# Patient Record
Sex: MALE | Race: WHITE | NOT HISPANIC OR LATINO | Employment: FULL TIME | URBAN - METROPOLITAN AREA
[De-identification: names, ages, dates, MRNs, and addresses within clinical notes are randomized per-mention and may not be internally consistent; named-entity substitution may affect disease eponyms.]

---

## 2023-03-31 ENCOUNTER — TELEPHONE (OUTPATIENT)
Dept: PRIMARY CARE | Facility: CLINIC | Age: 63
End: 2023-03-31
Payer: COMMERCIAL

## 2023-03-31 DIAGNOSIS — E78.5 HYPERLIPIDEMIA, UNSPECIFIED HYPERLIPIDEMIA TYPE: ICD-10-CM

## 2023-03-31 NOTE — TELEPHONE ENCOUNTER
Refill    Atorvastatin Calcium 10 mg oral tablet,take 1 tablet at bedtime    Pharm: [facility] - [phone]    LR: 9/13/22 90 days 1 refill  LV: 9/22/22  NV: 4/10/23

## 2023-04-03 RX ORDER — ATORVASTATIN CALCIUM 10 MG/1
10 TABLET, FILM COATED ORAL NIGHTLY
Qty: 90 TABLET | Refills: 0 | Status: SHIPPED | OUTPATIENT
Start: 2023-04-03 | End: 2023-04-24 | Stop reason: SDUPTHER

## 2023-04-03 RX ORDER — ATORVASTATIN CALCIUM 10 MG/1
10 TABLET, FILM COATED ORAL NIGHTLY
COMMUNITY
End: 2023-04-03 | Stop reason: SDUPTHER

## 2023-04-10 ENCOUNTER — APPOINTMENT (OUTPATIENT)
Dept: PRIMARY CARE | Facility: CLINIC | Age: 63
End: 2023-04-10
Payer: COMMERCIAL

## 2023-04-24 ENCOUNTER — OFFICE VISIT (OUTPATIENT)
Dept: PRIMARY CARE | Facility: CLINIC | Age: 63
End: 2023-04-24
Payer: COMMERCIAL

## 2023-04-24 VITALS
BODY MASS INDEX: 28.8 KG/M2 | WEIGHT: 195 LBS | SYSTOLIC BLOOD PRESSURE: 126 MMHG | DIASTOLIC BLOOD PRESSURE: 70 MMHG | TEMPERATURE: 97.1 F

## 2023-04-24 DIAGNOSIS — I10 HYPERTENSION, UNSPECIFIED TYPE: Primary | ICD-10-CM

## 2023-04-24 DIAGNOSIS — E78.5 HYPERLIPIDEMIA, UNSPECIFIED HYPERLIPIDEMIA TYPE: ICD-10-CM

## 2023-04-24 DIAGNOSIS — N40.1 BENIGN PROSTATIC HYPERPLASIA WITH LOWER URINARY TRACT SYMPTOMS, SYMPTOM DETAILS UNSPECIFIED: ICD-10-CM

## 2023-04-24 DIAGNOSIS — N52.9 ERECTILE DYSFUNCTION, UNSPECIFIED ERECTILE DYSFUNCTION TYPE: ICD-10-CM

## 2023-04-24 PROBLEM — D17.1 LIPOMA OF BACK: Status: ACTIVE | Noted: 2023-04-24

## 2023-04-24 PROBLEM — R94.31 ABNORMAL EKG: Status: ACTIVE | Noted: 2023-04-24

## 2023-04-24 PROBLEM — N40.0 BPH (BENIGN PROSTATIC HYPERPLASIA): Status: ACTIVE | Noted: 2023-04-24

## 2023-04-24 PROBLEM — D22.5 ATYPICAL NEVUS OF BACK: Status: ACTIVE | Noted: 2023-04-24

## 2023-04-24 PROBLEM — R35.0 URINARY FREQUENCY: Status: ACTIVE | Noted: 2023-04-24

## 2023-04-24 PROBLEM — R35.0 URINARY FREQUENCY: Status: RESOLVED | Noted: 2023-04-24 | Resolved: 2023-04-24

## 2023-04-24 PROCEDURE — 1036F TOBACCO NON-USER: CPT | Performed by: FAMILY MEDICINE

## 2023-04-24 PROCEDURE — 80061 LIPID PANEL: CPT

## 2023-04-24 PROCEDURE — 80053 COMPREHEN METABOLIC PANEL: CPT

## 2023-04-24 PROCEDURE — 3078F DIAST BP <80 MM HG: CPT | Performed by: FAMILY MEDICINE

## 2023-04-24 PROCEDURE — 84443 ASSAY THYROID STIM HORMONE: CPT

## 2023-04-24 PROCEDURE — 36415 COLL VENOUS BLD VENIPUNCTURE: CPT | Performed by: FAMILY MEDICINE

## 2023-04-24 PROCEDURE — 99214 OFFICE O/P EST MOD 30 MIN: CPT | Performed by: FAMILY MEDICINE

## 2023-04-24 PROCEDURE — 84153 ASSAY OF PSA TOTAL: CPT

## 2023-04-24 PROCEDURE — 3074F SYST BP LT 130 MM HG: CPT | Performed by: FAMILY MEDICINE

## 2023-04-24 RX ORDER — AMLODIPINE BESYLATE 5 MG/1
5 TABLET ORAL DAILY
Qty: 90 TABLET | Refills: 1 | Status: SHIPPED | OUTPATIENT
Start: 2023-04-24 | End: 2023-12-18 | Stop reason: SDUPTHER

## 2023-04-24 RX ORDER — AMLODIPINE BESYLATE 5 MG/1
5 TABLET ORAL DAILY
COMMUNITY
End: 2023-04-24 | Stop reason: SDUPTHER

## 2023-04-24 RX ORDER — ATORVASTATIN CALCIUM 10 MG/1
10 TABLET, FILM COATED ORAL NIGHTLY
Qty: 90 TABLET | Refills: 1 | Status: SHIPPED | OUTPATIENT
Start: 2023-04-24 | End: 2023-12-18 | Stop reason: SDUPTHER

## 2023-04-24 RX ORDER — METOPROLOL SUCCINATE 25 MG/1
1 TABLET, EXTENDED RELEASE ORAL DAILY
COMMUNITY
Start: 2022-02-10 | End: 2023-04-24

## 2023-04-24 RX ORDER — TADALAFIL 5 MG/1
5 TABLET ORAL DAILY
COMMUNITY
End: 2023-04-24 | Stop reason: SDUPTHER

## 2023-04-24 RX ORDER — ASPIRIN 81 MG/1
1 TABLET ORAL DAILY
COMMUNITY
Start: 2022-02-15

## 2023-04-24 RX ORDER — TADALAFIL 5 MG/1
5 TABLET ORAL DAILY
Qty: 90 TABLET | Refills: 1 | Status: SHIPPED | OUTPATIENT
Start: 2023-04-24 | End: 2023-12-18 | Stop reason: SDUPTHER

## 2023-04-24 RX ORDER — KETOCONAZOLE 20 MG/ML
SHAMPOO, SUSPENSION TOPICAL
COMMUNITY
Start: 2022-04-01 | End: 2023-04-24 | Stop reason: ALTCHOICE

## 2023-04-24 NOTE — PATIENT INSTRUCTIONS
Losing weight is very important for your overall health.  Some apps for your smart phone might help like [employer], or using the Weight [employer] web site on your computer. An optimistic average weight loss is about one pound per week, which can be achieved with a 500 calorie deficit daily.

## 2023-04-24 NOTE — PROGRESS NOTES
Subjective   Patient ID: [id number]     Brian Pichardo is a 63 y.o. male who presents for Med Management.    HPI  Taking meds as directed without tolerability or affordability issues; no complaints today.  Ran out of  metoprolo twomonths ago and has been ok without it    Review of Systems    CARDIO- No chest pain or pressure, nausea, diaphoresis, paresthesias, dizziness, or syncope with or without exertion  GI-No blood in stool, tarry stools, pain, vomiting, heartburn, constipation or diarrhea  PULM-No wheezing, coughing or shortness of breath  UROL-No urgency, blood in urine, or incontinence;  frequency has resolved  Musculoskeletal-No locking, giving way/swelling of joints  Neurology- No headaches, hx of concussion, falls in the last year or seizure  Allergy/Immunology-No history of sneezing or itching  Dermatology-No rashes, blanching or  change in any moles      Objective     /70 (BP Location: Right arm, Patient Position: Sitting)   Temp 36.2 °C (97.1 °F) (Skin)   Wt 88.5 kg (195 lb)   BMI 28.80 kg/m²      Physical Exam    Neck-supple without lymphadenopathy or thyromegaly; no carotid bruits  Throat- without erythema or exudate, uvula in midlineNeck-supple without lymphadenopathy or thyromegaly; no carotid bruits  Heart- regular rate and rhythm, normal s1 and s2 without murmur or gallop  Lungs-clear to auscultation  Abdomen-soft, positive bowel sounds, without masses, HSmegaly or pain   Male Genitalia-circumcised penis and normal testes bilaterally without hernia   Rectal- normal ampulla and anus; hemetest negative; prostate smooth, normal size, and symmetrical      Assessment/Plan     Problem List Items Addressed This Visit          Other    Hyperlipidemia       Ananth Carballo MD

## 2023-04-25 LAB
ALANINE AMINOTRANSFERASE (SGPT) (U/L) IN SER/PLAS: 21 U/L (ref 10–52)
ALBUMIN (G/DL) IN SER/PLAS: 4.5 G/DL (ref 3.4–5)
ALKALINE PHOSPHATASE (U/L) IN SER/PLAS: 93 U/L (ref 33–136)
ANION GAP IN SER/PLAS: 15 MMOL/L (ref 10–20)
ASPARTATE AMINOTRANSFERASE (SGOT) (U/L) IN SER/PLAS: 20 U/L (ref 9–39)
BILIRUBIN TOTAL (MG/DL) IN SER/PLAS: 1.1 MG/DL (ref 0–1.2)
CALCIUM (MG/DL) IN SER/PLAS: 9.9 MG/DL (ref 8.6–10.6)
CARBON DIOXIDE, TOTAL (MMOL/L) IN SER/PLAS: 25 MMOL/L (ref 21–32)
CHLORIDE (MMOL/L) IN SER/PLAS: 106 MMOL/L (ref 98–107)
CHOLESTEROL (MG/DL) IN SER/PLAS: 149 MG/DL (ref 0–199)
CHOLESTEROL IN HDL (MG/DL) IN SER/PLAS: 38.3 MG/DL
CHOLESTEROL/HDL RATIO: 3.9
CREATININE (MG/DL) IN SER/PLAS: 0.8 MG/DL (ref 0.5–1.3)
GFR MALE: >90 ML/MIN/1.73M2
GLUCOSE (MG/DL) IN SER/PLAS: 77 MG/DL (ref 74–99)
LDL: 98 MG/DL (ref 0–99)
POTASSIUM (MMOL/L) IN SER/PLAS: 4.1 MMOL/L (ref 3.5–5.3)
PROSTATE SPECIFIC ANTIGEN,SCREEN: 1.75 NG/ML (ref 0–4)
PROTEIN TOTAL: 7.1 G/DL (ref 6.4–8.2)
SODIUM (MMOL/L) IN SER/PLAS: 142 MMOL/L (ref 136–145)
THYROTROPIN (MIU/L) IN SER/PLAS BY DETECTION LIMIT <= 0.05 MIU/L: 1.15 MIU/L (ref 0.44–3.98)
TRIGLYCERIDE (MG/DL) IN SER/PLAS: 65 MG/DL (ref 0–149)
UREA NITROGEN (MG/DL) IN SER/PLAS: 9 MG/DL (ref 6–23)
VLDL: 13 MG/DL (ref 0–40)

## 2023-10-21 ENCOUNTER — CLINICAL SUPPORT (OUTPATIENT)
Dept: PRIMARY CARE | Facility: CLINIC | Age: 63
End: 2023-10-21
Payer: COMMERCIAL

## 2023-10-21 VITALS — TEMPERATURE: 98.2 F

## 2023-10-21 DIAGNOSIS — Z23 ENCOUNTER FOR IMMUNIZATION: Primary | ICD-10-CM

## 2023-10-21 PROCEDURE — 90686 IIV4 VACC NO PRSV 0.5 ML IM: CPT | Performed by: FAMILY MEDICINE

## 2023-10-21 PROCEDURE — 90471 IMMUNIZATION ADMIN: CPT | Performed by: FAMILY MEDICINE

## 2023-12-15 DIAGNOSIS — N52.9 ERECTILE DYSFUNCTION, UNSPECIFIED ERECTILE DYSFUNCTION TYPE: ICD-10-CM

## 2023-12-15 DIAGNOSIS — I10 HYPERTENSION, UNSPECIFIED TYPE: ICD-10-CM

## 2023-12-15 DIAGNOSIS — E78.5 HYPERLIPIDEMIA, UNSPECIFIED HYPERLIPIDEMIA TYPE: ICD-10-CM

## 2023-12-15 NOTE — TELEPHONE ENCOUNTER
Refill:    1) Amlodipine 5mg daily    2) Atorvastin 10mg daily    3) Tadalifil 5mg one tab prn     Pharm: [facility] # [phone]    LR: 04/24/23 qty 90 w/ 1 refill  LV: 04/24/23  NV: 12/29/23

## 2023-12-18 RX ORDER — TADALAFIL 5 MG/1
5 TABLET ORAL DAILY
Qty: 90 TABLET | Refills: 1 | Status: SHIPPED | OUTPATIENT
Start: 2023-12-18 | End: 2023-12-29 | Stop reason: SDUPTHER

## 2023-12-18 RX ORDER — ATORVASTATIN CALCIUM 10 MG/1
10 TABLET, FILM COATED ORAL NIGHTLY
Qty: 90 TABLET | Refills: 1 | Status: SHIPPED | OUTPATIENT
Start: 2023-12-18 | End: 2023-12-29 | Stop reason: SDUPTHER

## 2023-12-18 RX ORDER — AMLODIPINE BESYLATE 5 MG/1
5 TABLET ORAL DAILY
Qty: 90 TABLET | Refills: 1 | Status: SHIPPED | OUTPATIENT
Start: 2023-12-18 | End: 2023-12-29 | Stop reason: SDUPTHER

## 2023-12-28 PROBLEM — R94.31 ABNORMAL EKG: Status: RESOLVED | Noted: 2023-04-24 | Resolved: 2023-12-28

## 2023-12-28 NOTE — PATIENT INSTRUCTIONS
Exercising for 30' three times weekly, at an intensity that makes it difficult to say The Pledge [place] Alleg[place] on one breath, is a good basic exercise program    See your eye doctor at least every two years to screen for the gradual loss of vision that can occur with Glaucoma.    Your dental health affects your overall health and as such you should see the dentist at least every six months.      Follow up fasting (no alcohol for 48 hours and just water for 14 hours) in six months for your next routine appointment.  In general, take any medications on schedule (except for types of Insulin).

## 2023-12-28 NOTE — PROGRESS NOTES
Subjective   Patient ID: [id number]     Brian Pichardo is a 63 y.o. male who presents for Med Management.    HPI  Taking meds as directed without tolerability or affordability issues; no complaints today.    Review of Systems  GEN-denies, fever, weakness or myalgias; no unexplained fever or chills  OPTH-No dry eyes, itchy eyes, or blurry vision   ENT-No hearing loss, tinnitus or vertigo  NECK-no stiffness, swelling or pain  PSYCH-No complaints regarding appetite, memory or concentration;  no drug use or alcohol usage over six per week  SLEEP-No complaints of insomnia, apnea or restless legs;  wakes up rested in the morning  ALL/IMMUN-No history of sneezing or itching  HEM-No unexplained bruising or bleeding    Objective     /82 (BP Location: Left arm, Patient Position: Sitting)   Temp 36.8 °C (98.2 °F) (Oral)   Wt 89.4 kg (197 lb)   BMI 29.11 kg/m²      Physical Exam  Eyes-pupils equal round, reactive to light and accommodation, fundi with normal cup/disc ratio, conjunctiva without redness or discharge  General- well defined, well nourished, well hydrated individual in NAD  Skin- normal color and turgor; without nail pitting  Head-normocephalic without masses or tenderness  Ears-normal pinnae, and canals, with normal landmarks and light reflex of tympanic membranes bilaterally  Nose-septum in the midline, normal mucosa bilaterally  Throat- without erythema or exudate, uvula in midline  Neck-supple without lymphadenopathy or thyromegaly; no carotid bruits  Heart- regular rate and rhythm, normal s1 and s2 without murmur or gallop  Lungs-clear to auscultation  Abdomen-soft, positive bowel sounds, without masses, HSmegaly or pain     Assessment/Plan     Problem List Items Addressed This Visit       Hyperlipidemia    Relevant Orders    POCT fingerstick glucose manually resulted    POCT Lipid Panel manually resulted    Hypertension - Primary     Exercising for 30' three times weekly, at an intensity that makes it  difficult to say [employer] Pledge [employer] Alleg[employer] on one breath, is a good basic exercise program    See your eye doctor at least every two years to screen for the gradual loss of vision that can occur with Glaucoma.    Your dental health affects your overall health and as such you should see the dentist at least every six months.      Follow up fasting (no alcohol for 48 hours and just water for 14 hours) in six months for your next routine appointment.  In general, take any medications on schedule (except for types of Insulin).        Ananth Carballo MD

## 2023-12-29 ENCOUNTER — OFFICE VISIT (OUTPATIENT)
Dept: PRIMARY CARE | Facility: CLINIC | Age: 63
End: 2023-12-29
Payer: COMMERCIAL

## 2023-12-29 VITALS
DIASTOLIC BLOOD PRESSURE: 82 MMHG | WEIGHT: 197 LBS | SYSTOLIC BLOOD PRESSURE: 134 MMHG | TEMPERATURE: 98.2 F | BODY MASS INDEX: 29.11 KG/M2

## 2023-12-29 DIAGNOSIS — N52.9 ERECTILE DYSFUNCTION, UNSPECIFIED ERECTILE DYSFUNCTION TYPE: ICD-10-CM

## 2023-12-29 DIAGNOSIS — E78.5 HYPERLIPIDEMIA, UNSPECIFIED HYPERLIPIDEMIA TYPE: ICD-10-CM

## 2023-12-29 DIAGNOSIS — I10 HYPERTENSION, UNSPECIFIED TYPE: Primary | ICD-10-CM

## 2023-12-29 LAB
POC FINGERSTICK BLOOD GLUCOSE: 91 MG/DL (ref 70–100)
POC HDL CHOLESTEROL: 37 MG/DL (ref 0–40)
POC LDL CHOLESTEROL: 102 MG/DL (ref 0–100)
POC NON-HDL CHOLESTEROL: 136 MG/DL (ref 0–130)
POC TOTAL CHOLESTEROL/HDL RATIO: 4.7 (ref 0–4.5)
POC TOTAL CHOLESTEROL: 173 MG/DL (ref 0–199)
POC TRIGLYCERIDES: 167 MG/DL (ref 0–150)

## 2023-12-29 PROCEDURE — 99214 OFFICE O/P EST MOD 30 MIN: CPT | Performed by: FAMILY MEDICINE

## 2023-12-29 PROCEDURE — 80061 LIPID PANEL: CPT | Performed by: FAMILY MEDICINE

## 2023-12-29 PROCEDURE — 1036F TOBACCO NON-USER: CPT | Performed by: FAMILY MEDICINE

## 2023-12-29 PROCEDURE — 3079F DIAST BP 80-89 MM HG: CPT | Performed by: FAMILY MEDICINE

## 2023-12-29 PROCEDURE — 82962 GLUCOSE BLOOD TEST: CPT | Performed by: FAMILY MEDICINE

## 2023-12-29 PROCEDURE — 3075F SYST BP GE 130 - 139MM HG: CPT | Performed by: FAMILY MEDICINE

## 2023-12-29 RX ORDER — AMLODIPINE BESYLATE 5 MG/1
5 TABLET ORAL DAILY
Qty: 90 TABLET | Refills: 2 | Status: SHIPPED | OUTPATIENT
Start: 2023-12-29 | End: 2024-09-24

## 2023-12-29 RX ORDER — ATORVASTATIN CALCIUM 10 MG/1
10 TABLET, FILM COATED ORAL NIGHTLY
Qty: 90 TABLET | Refills: 2 | Status: SHIPPED | OUTPATIENT
Start: 2023-12-29 | End: 2024-09-24

## 2023-12-29 RX ORDER — TADALAFIL 5 MG/1
5 TABLET ORAL DAILY
Qty: 90 TABLET | Refills: 2 | Status: SHIPPED | OUTPATIENT
Start: 2023-12-29 | End: 2024-09-24

## 2024-07-01 ENCOUNTER — APPOINTMENT (OUTPATIENT)
Dept: PRIMARY CARE | Facility: CLINIC | Age: 64
End: 2024-07-01
Payer: COMMERCIAL

## 2024-07-01 DIAGNOSIS — N40.1 BENIGN PROSTATIC HYPERPLASIA WITH LOWER URINARY TRACT SYMPTOMS, SYMPTOM DETAILS UNSPECIFIED: ICD-10-CM

## 2024-07-01 DIAGNOSIS — E78.5 HYPERLIPIDEMIA, UNSPECIFIED HYPERLIPIDEMIA TYPE: ICD-10-CM

## 2024-08-10 ENCOUNTER — APPOINTMENT (OUTPATIENT)
Dept: PRIMARY CARE | Facility: CLINIC | Age: 64
End: 2024-08-10
Payer: COMMERCIAL

## 2024-08-17 ENCOUNTER — APPOINTMENT (OUTPATIENT)
Dept: PRIMARY CARE | Facility: CLINIC | Age: 64
End: 2024-08-17
Payer: COMMERCIAL

## 2024-08-17 VITALS
DIASTOLIC BLOOD PRESSURE: 84 MMHG | BODY MASS INDEX: 28.25 KG/M2 | TEMPERATURE: 97.8 F | SYSTOLIC BLOOD PRESSURE: 132 MMHG | WEIGHT: 191.14 LBS

## 2024-08-17 DIAGNOSIS — N52.9 ERECTILE DYSFUNCTION, UNSPECIFIED ERECTILE DYSFUNCTION TYPE: ICD-10-CM

## 2024-08-17 DIAGNOSIS — D22.5 ATYPICAL NEVUS OF BACK: ICD-10-CM

## 2024-08-17 DIAGNOSIS — I10 HYPERTENSION, UNSPECIFIED TYPE: Primary | ICD-10-CM

## 2024-08-17 DIAGNOSIS — N40.1 BENIGN PROSTATIC HYPERPLASIA WITH LOWER URINARY TRACT SYMPTOMS, SYMPTOM DETAILS UNSPECIFIED: ICD-10-CM

## 2024-08-17 DIAGNOSIS — D17.1 LIPOMA OF BACK: ICD-10-CM

## 2024-08-17 DIAGNOSIS — E78.5 HYPERLIPIDEMIA, UNSPECIFIED HYPERLIPIDEMIA TYPE: ICD-10-CM

## 2024-08-17 LAB
ALBUMIN SERPL BCP-MCNC: 4.3 G/DL (ref 3.4–5)
ALP SERPL-CCNC: 107 U/L (ref 33–136)
ALT SERPL W P-5'-P-CCNC: 18 U/L (ref 10–52)
ANION GAP SERPL CALC-SCNC: 12 MMOL/L (ref 10–20)
AST SERPL W P-5'-P-CCNC: 23 U/L (ref 9–39)
BILIRUB SERPL-MCNC: 0.8 MG/DL (ref 0–1.2)
BUN SERPL-MCNC: 12 MG/DL (ref 6–23)
CALCIUM SERPL-MCNC: 9.6 MG/DL (ref 8.6–10.3)
CHLORIDE SERPL-SCNC: 106 MMOL/L (ref 98–107)
CHOLEST SERPL-MCNC: 124 MG/DL (ref 0–199)
CHOLESTEROL/HDL RATIO: 3.2
CO2 SERPL-SCNC: 27 MMOL/L (ref 21–32)
CREAT SERPL-MCNC: 0.98 MG/DL (ref 0.5–1.3)
EGFRCR SERPLBLD CKD-EPI 2021: 86 ML/MIN/1.73M*2
GLUCOSE SERPL-MCNC: 92 MG/DL (ref 74–99)
HDLC SERPL-MCNC: 38.2 MG/DL
LDLC SERPL CALC-MCNC: 72 MG/DL
NON HDL CHOLESTEROL: 86 MG/DL (ref 0–149)
POTASSIUM SERPL-SCNC: 4 MMOL/L (ref 3.5–5.3)
PROT SERPL-MCNC: 6.7 G/DL (ref 6.4–8.2)
PSA SERPL-MCNC: 1.92 NG/ML
SODIUM SERPL-SCNC: 141 MMOL/L (ref 136–145)
TRIGL SERPL-MCNC: 68 MG/DL (ref 0–149)
TSH SERPL-ACNC: 1.39 MIU/L (ref 0.44–3.98)
VLDL: 14 MG/DL (ref 0–40)

## 2024-08-17 PROCEDURE — 99214 OFFICE O/P EST MOD 30 MIN: CPT | Performed by: FAMILY MEDICINE

## 2024-08-17 PROCEDURE — 3075F SYST BP GE 130 - 139MM HG: CPT | Performed by: FAMILY MEDICINE

## 2024-08-17 PROCEDURE — 1036F TOBACCO NON-USER: CPT | Performed by: FAMILY MEDICINE

## 2024-08-17 PROCEDURE — 80061 LIPID PANEL: CPT

## 2024-08-17 PROCEDURE — 3079F DIAST BP 80-89 MM HG: CPT | Performed by: FAMILY MEDICINE

## 2024-08-17 PROCEDURE — 80053 COMPREHEN METABOLIC PANEL: CPT

## 2024-08-17 PROCEDURE — 84153 ASSAY OF PSA TOTAL: CPT

## 2024-08-17 PROCEDURE — 84443 ASSAY THYROID STIM HORMONE: CPT

## 2024-09-28 DIAGNOSIS — E78.5 HYPERLIPIDEMIA, UNSPECIFIED HYPERLIPIDEMIA TYPE: ICD-10-CM

## 2024-09-30 RX ORDER — ATORVASTATIN CALCIUM 10 MG/1
10 TABLET, FILM COATED ORAL NIGHTLY
Qty: 90 TABLET | Refills: 2 | OUTPATIENT
Start: 2024-09-30

## 2024-10-02 ENCOUNTER — TELEPHONE (OUTPATIENT)
Dept: PRIMARY CARE | Facility: CLINIC | Age: 64
End: 2024-10-02
Payer: COMMERCIAL

## 2024-10-02 DIAGNOSIS — E78.5 HYPERLIPIDEMIA, UNSPECIFIED HYPERLIPIDEMIA TYPE: ICD-10-CM

## 2024-10-02 RX ORDER — ATORVASTATIN CALCIUM 10 MG/1
10 TABLET, FILM COATED ORAL NIGHTLY
Qty: 90 TABLET | Refills: 2 | Status: SHIPPED | OUTPATIENT
Start: 2024-10-02 | End: 2025-06-29

## 2024-10-02 NOTE — TELEPHONE ENCOUNTER
Refill request     Med name-atorvastatin  Med dose-10mg  Med directions-take 1 tablet daily at bedtime    Pharmacy-[facility]  Pharmacy address-[place]    LR-12/29/23  LV-08/17/24  Nv-02/21/25    Thank-you

## 2025-02-12 DIAGNOSIS — N52.9 ERECTILE DYSFUNCTION, UNSPECIFIED ERECTILE DYSFUNCTION TYPE: ICD-10-CM

## 2025-02-13 ENCOUNTER — TELEPHONE (OUTPATIENT)
Dept: PRIMARY CARE | Facility: CLINIC | Age: 65
End: 2025-02-13
Payer: COMMERCIAL

## 2025-02-13 DIAGNOSIS — N52.9 ERECTILE DYSFUNCTION, UNSPECIFIED ERECTILE DYSFUNCTION TYPE: ICD-10-CM

## 2025-02-13 RX ORDER — TADALAFIL 5 MG/1
5 TABLET ORAL DAILY
Qty: 90 TABLET | Refills: 2 | OUTPATIENT
Start: 2025-02-13

## 2025-02-13 NOTE — TELEPHONE ENCOUNTER
Refill request     Med name-tadalafil  Med dose-5mg  Med directions-take 1 tablet daily    Pharmacy-[facility]  Pharmacy address-[place]    LR-12/29/23  LV-08/17/24  Nv-03/29/25    Thanks

## 2025-02-14 RX ORDER — TADALAFIL 5 MG/1
5 TABLET ORAL DAILY
Qty: 90 TABLET | Refills: 1 | Status: SHIPPED | OUTPATIENT
Start: 2025-02-14

## 2025-02-21 ENCOUNTER — APPOINTMENT (OUTPATIENT)
Dept: PRIMARY CARE | Facility: CLINIC | Age: 65
End: 2025-02-21
Payer: COMMERCIAL

## 2025-03-15 ENCOUNTER — APPOINTMENT (OUTPATIENT)
Dept: PRIMARY CARE | Facility: CLINIC | Age: 65
End: 2025-03-15
Payer: COMMERCIAL

## 2025-03-18 ENCOUNTER — TELEPHONE (OUTPATIENT)
Dept: PRIMARY CARE | Facility: CLINIC | Age: 65
End: 2025-03-18
Payer: COMMERCIAL

## 2025-03-18 DIAGNOSIS — I10 HYPERTENSION, UNSPECIFIED TYPE: ICD-10-CM

## 2025-03-18 RX ORDER — AMLODIPINE BESYLATE 5 MG/1
5 TABLET ORAL DAILY
Qty: 30 TABLET | Refills: 0 | Status: SHIPPED | OUTPATIENT
Start: 2025-03-18 | End: 2025-04-17

## 2025-03-18 NOTE — TELEPHONE ENCOUNTER
Covering for JT- pt aware only a 30 ds will be called in     Med name-amlodipine  Med dose-5mg  Med directions-take 1 tablet daily    Pharmacy-[facility]  Pharmacy address-[place]    LR-12/29/23  LV-08/17/24  NV-03/29/25    Thanks

## 2025-03-28 NOTE — PROGRESS NOTES
Subjective   Patient ID: [id number]     Brian Pichardo is a 65 y.o. male who presents for Med Management.    HPI  Taking meds as directed without tolerability or affordability issues; no complaints today.    Review of Systems  GEN-denies, weakness or myalgias; no unexplained fever or chills  OPTH-No dry eyes, itchy eyes, or blurry vision   ENT-No hearing loss, tinnitus or vertigo  NECK-no stiffness, swelling or pain  PSYCH-No complaints regarding appetite, memory or concentration;  no drug use or alcohol usage over six per week  SLEEP-No complaints of insomnia, apnea or restless legs;  patient is waking up rested;  snores  ALL/IMMUN-No history of sneezing or itching  HEM-No unexplained bruising or bleeding    Objective     /86 (BP Location: Left arm, Patient Position: Sitting, BP Cuff Size: Adult)   Temp 36.7 °C (98 °F) (Oral)   Wt 93.1 kg (205 lb 4 oz)   BMI 30.33 kg/m²      Physical Exam  General- well defined, well nourished, well hydrated individual in NAD  Skin- normal color and turgor; without nail pitting  Head-normocephalic without masses or tenderness  Eyes-pupils equal round, reactive to light and accommodation, fundi with normal cup/disc ratio, conjunctiva without redness or discharge  Ears-normal pinnae, and canals, with normal landmarks and light reflex of tympanic membranes bilaterally  Nose-septum in the midline, normal mucosa bilaterally  Throat- without erythema or exudate, uvula in midline  Neck-supple without lymphadenopathy or thyromegaly; no carotid bruits  Heart- regular rate and rhythm, normal s1 and s2 without murmur or gallop; peripheral pulses 2+ and symmetrical  Lungs-clear to auscultation  Abdomen-soft, positive bowel sounds, without masses, HSmegaly or pain     Assessment/Plan     Problem List Items Addressed This Visit       Hyperlipidemia - Primary    Relevant Medications    atorvastatin (Lipitor) 10 mg tablet    Other Relevant Orders    Glucose    Lipid Panel    Hypertension     Relevant Medications    amLODIPine (Norvasc) 5 mg tablet     I suggest you get the flu and COVID booster.    Follow up fasting (no alcohol for 48 hours and just water for 14 hours) in six months for your next routine appointment.  In general, take any medications on schedule (except for types of Insulin).      Ananth Carballo MD

## 2025-03-29 ENCOUNTER — APPOINTMENT (OUTPATIENT)
Dept: PRIMARY CARE | Facility: CLINIC | Age: 65
End: 2025-03-29
Payer: COMMERCIAL

## 2025-03-29 VITALS
BODY MASS INDEX: 30.33 KG/M2 | WEIGHT: 205.25 LBS | SYSTOLIC BLOOD PRESSURE: 142 MMHG | TEMPERATURE: 98 F | DIASTOLIC BLOOD PRESSURE: 86 MMHG

## 2025-03-29 DIAGNOSIS — E78.5 HYPERLIPIDEMIA, UNSPECIFIED HYPERLIPIDEMIA TYPE: Primary | ICD-10-CM

## 2025-03-29 DIAGNOSIS — I10 HYPERTENSION, UNSPECIFIED TYPE: ICD-10-CM

## 2025-03-29 PROCEDURE — 1159F MED LIST DOCD IN RCRD: CPT | Performed by: FAMILY MEDICINE

## 2025-03-29 PROCEDURE — 3077F SYST BP >= 140 MM HG: CPT | Performed by: FAMILY MEDICINE

## 2025-03-29 PROCEDURE — 99214 OFFICE O/P EST MOD 30 MIN: CPT | Performed by: FAMILY MEDICINE

## 2025-03-29 PROCEDURE — 1160F RVW MEDS BY RX/DR IN RCRD: CPT | Performed by: FAMILY MEDICINE

## 2025-03-29 PROCEDURE — 90471 IMMUNIZATION ADMIN: CPT | Performed by: FAMILY MEDICINE

## 2025-03-29 PROCEDURE — 90677 PCV20 VACCINE IM: CPT | Performed by: FAMILY MEDICINE

## 2025-03-29 PROCEDURE — 3079F DIAST BP 80-89 MM HG: CPT | Performed by: FAMILY MEDICINE

## 2025-03-29 RX ORDER — ATORVASTATIN CALCIUM 10 MG/1
10 TABLET, FILM COATED ORAL NIGHTLY
Qty: 90 TABLET | Refills: 1 | Status: SHIPPED | OUTPATIENT
Start: 2025-03-29

## 2025-03-29 RX ORDER — AMLODIPINE BESYLATE 5 MG/1
5 TABLET ORAL DAILY
Qty: 90 TABLET | Refills: 1 | Status: SHIPPED | OUTPATIENT
Start: 2025-03-29

## 2025-03-30 LAB
CHOLEST SERPL-MCNC: 142 MG/DL
CHOLEST/HDLC SERPL: 3.5 (CALC)
GLUCOSE SERPL-MCNC: 96 MG/DL (ref 65–99)
HDLC SERPL-MCNC: 41 MG/DL
LDLC SERPL CALC-MCNC: 86 MG/DL (CALC)
NONHDLC SERPL-MCNC: 101 MG/DL (CALC)
TRIGL SERPL-MCNC: 66 MG/DL

## 2025-09-26 ENCOUNTER — APPOINTMENT (OUTPATIENT)
Dept: PRIMARY CARE | Facility: CLINIC | Age: 65
End: 2025-09-26
Payer: COMMERCIAL